# Patient Record
Sex: FEMALE | Race: WHITE | Employment: OTHER | URBAN - METROPOLITAN AREA
[De-identification: names, ages, dates, MRNs, and addresses within clinical notes are randomized per-mention and may not be internally consistent; named-entity substitution may affect disease eponyms.]

---

## 2017-10-14 ENCOUNTER — APPOINTMENT (OUTPATIENT)
Dept: GENERAL RADIOLOGY | Facility: HOSPITAL | Age: 82
End: 2017-10-14
Attending: EMERGENCY MEDICINE
Payer: MEDICARE

## 2017-10-14 ENCOUNTER — HOSPITAL ENCOUNTER (EMERGENCY)
Facility: HOSPITAL | Age: 82
Discharge: HOME | End: 2017-10-14
Attending: EMERGENCY MEDICINE
Payer: MEDICARE

## 2017-10-14 ENCOUNTER — APPOINTMENT (OUTPATIENT)
Dept: CT IMAGING | Facility: HOSPITAL | Age: 82
End: 2017-10-14
Attending: EMERGENCY MEDICINE
Payer: MEDICARE

## 2017-10-14 VITALS
BODY MASS INDEX: 24.54 KG/M2 | TEMPERATURE: 98 F | SYSTOLIC BLOOD PRESSURE: 208 MMHG | OXYGEN SATURATION: 98 % | DIASTOLIC BLOOD PRESSURE: 77 MMHG | RESPIRATION RATE: 16 BRPM | HEIGHT: 60 IN | WEIGHT: 125 LBS | HEART RATE: 54 BPM

## 2017-10-14 DIAGNOSIS — S61.512A TEAR OF SKIN OF LEFT WRIST, INITIAL ENCOUNTER: ICD-10-CM

## 2017-10-14 DIAGNOSIS — S09.90XA CLOSED HEAD INJURY, INITIAL ENCOUNTER: Primary | ICD-10-CM

## 2017-10-14 DIAGNOSIS — I10 HYPERTENSION, UNSPECIFIED TYPE: ICD-10-CM

## 2017-10-14 DIAGNOSIS — S00.81XA ABRASION OF FACE, INITIAL ENCOUNTER: ICD-10-CM

## 2017-10-14 PROCEDURE — 80048 BASIC METABOLIC PNL TOTAL CA: CPT | Performed by: EMERGENCY MEDICINE

## 2017-10-14 PROCEDURE — 85025 COMPLETE CBC W/AUTO DIFF WBC: CPT | Performed by: EMERGENCY MEDICINE

## 2017-10-14 PROCEDURE — 73110 X-RAY EXAM OF WRIST: CPT | Performed by: EMERGENCY MEDICINE

## 2017-10-14 PROCEDURE — 70450 CT HEAD/BRAIN W/O DYE: CPT | Performed by: EMERGENCY MEDICINE

## 2017-10-14 PROCEDURE — 99284 EMERGENCY DEPT VISIT MOD MDM: CPT

## 2017-10-14 PROCEDURE — 70160 X-RAY EXAM OF NASAL BONES: CPT | Performed by: EMERGENCY MEDICINE

## 2017-10-14 PROCEDURE — 36415 COLL VENOUS BLD VENIPUNCTURE: CPT

## 2017-10-14 RX ORDER — LEVOTHYROXINE SODIUM 0.15 MG/1
150 TABLET ORAL
COMMUNITY

## 2017-10-14 RX ORDER — NITROFURANTOIN 25; 75 MG/1; MG/1
100 CAPSULE ORAL 2 TIMES DAILY
COMMUNITY

## 2017-10-14 RX ORDER — METOPROLOL TARTRATE 100 MG/1
100 TABLET ORAL 2 TIMES DAILY
COMMUNITY

## 2017-10-14 NOTE — ED NOTES
Patient being discharged home  Patient demonstrates improvement upon d/c. Pt verbalized understanding of d/c instructions. AVS provided. Pt home with family, wheeled out and assisted to car. Dr. Tamia Mackay ok with hypertension prior to DC.  Pt directed to priyank

## 2017-10-14 NOTE — ED PROVIDER NOTES
Patient Seen in: [facility] Emergency Department    History   Patient presents with:  Fall (musculoskeletal, neurologic)    Stated Complaint: fall    HPI    80-year-old female here after mechanical fall.   She was walking with her daughter-in-law on bridge of the nose. Nose: Nose normal.   Eyes: EOM are normal. Pupils are equal, round, and reactive to light. Neck: Normal range of motion. Neck supple. No JVD present. Cardiovascular: Normal rate and regular rhythm.     Pulmonary/Chest: Effort norm Madison Ocasio MD on 10/14/2017 at 17:06     Approved by: Madison Ocasio MD            Xr Wrist Complete (min 3 Views), Left (cpt=73110)    Result Date: 10/14/2017  CONCLUSION:  Arthritic. Negative for fracture or dislocation.     Dictated by: Madison Ocasio for blood pressure. We will discharge her home since she has a close follow-up already arranged. Return precautions discussed in detail with patient and family understand.       Disposition and Plan     Clinical Impression:  Closed head injury, initial

## 2017-10-14 NOTE — ED NOTES
Pt currently on antibiotics for UTI. Pt also hypertensive. Pt states she took her morning medication.

## 2017-10-14 NOTE — ED INITIAL ASSESSMENT (HPI)
Pt was walking outside in rain and had mechanical fall on sidewalk. Pt states she didn't get dizzy or pass out. Denies any CP or SOB. Pt reports she slipped. Denies LOC, neck or back pain. Pt A&O x4. Pt not on blood thinners.  Pt has skin tear to L hand and

## 2017-10-14 NOTE — ED NOTES
Pt returned from radiology, A&A. Denies any change in condition. Assisted to bedpan to urinate. Call light in reach. Family at bedside. Pt remains hypertensive.  MD aware

## (undated) NOTE — ED AVS SNAPSHOT
Pinky Becker   MRN: [id number]    Department:  [facility] Emergency Department   Date of Visit:  10/14/2017           Disclosure     Insurance plans vary and the physician(s) referred by the ER may not be covered by your plan.  Please contact your If you have been prescribed any medication(s), please fill your prescription right away and begin taking the medication(s) as directed    If the emergency physician has read X-rays, these will be re-interpreted by a radiologist.  If there is a significant